# Patient Record
(demographics unavailable — no encounter records)

---

## 2025-07-08 NOTE — CARDIOLOGY SUMMARY
[de-identified] : 7 8 2025: Sinus Rhythm  Low voltage in precordial leads. ABNORMAL [de-identified] : june 2024: stress test.  nuclear stress : normal   [de-identified] : june 2024:  normal LVEF>  71%  [de-identified] : lipid porfile: LDL :27    Tgs: 496.  HDl: 40  totoa:L 177 tgs 479

## 2025-07-08 NOTE — HISTORY OF PRESENT ILLNESS
[FreeTextEntry1] : reason : coronary artery disease evaluation.  HPI for today: : 2025  This is a 65 year old male with history of dyslipidemia and hypertension,  Diabetes Mellitus  here for coronary artery disease evalaution and establosh care. no chest pain no dyzspnea no diziznessn no syncope no palpitations   No smoking. occasional alcohol. No drugs.    Family history: Father:  no myocardial infarction. no Cerebro vascular accident ;  at 104 years  Mother :  +  myocardial infarction. No Cerebro vascular accident   MI  @ age 70 ;  passed away at 86.  Breast cancer  Siblings:   +  myocardial infarction.  No CVA  a   siblings cancer.   Mi seizure. = young drug use.  brother:  heart attack. 60

## 2025-07-08 NOTE — DISCUSSION/SUMMARY
[Patient] : the patient [Risks] : risks [Benefits] : benefits [Alternatives] : alternatives [With Me] : with me [___ Year(s)] : in [unfilled] year(s) [FreeTextEntry1] : This is a 65-year-old male with history of dyslipidemia and hypertension, Diabetes Mellitus here for coronary artery disease evaluation and establish care.  1) coronary artery disease prevention: Ct calcium score.  2) hypertension : ct current meds.   3) dyslipidemia :  ct current statins.  will check lipid profile and lipoprotein a check. [EKG obtained to assist in diagnosis and management of assessed problem(s)] : EKG obtained to assist in diagnosis and management of assessed problem(s)